# Patient Record
Sex: FEMALE | Race: WHITE | NOT HISPANIC OR LATINO | Employment: UNEMPLOYED | URBAN - NONMETROPOLITAN AREA
[De-identification: names, ages, dates, MRNs, and addresses within clinical notes are randomized per-mention and may not be internally consistent; named-entity substitution may affect disease eponyms.]

---

## 2020-10-15 RX ORDER — MELOXICAM 15 MG/1
15 TABLET ORAL DAILY
COMMUNITY
End: 2021-05-03

## 2020-10-15 RX ORDER — METHOCARBAMOL 500 MG/1
500 TABLET, FILM COATED ORAL 3 TIMES DAILY PRN
COMMUNITY
End: 2021-11-18

## 2020-10-15 RX ORDER — FERROUS SULFATE 325(65) MG
325 TABLET ORAL
COMMUNITY
End: 2021-05-11 | Stop reason: SDUPTHER

## 2020-10-15 RX ORDER — QUETIAPINE FUMARATE 50 MG/1
100 TABLET, FILM COATED ORAL NIGHTLY
COMMUNITY
End: 2020-10-28 | Stop reason: SDUPTHER

## 2020-10-15 RX ORDER — BUMETANIDE 0.5 MG/1
0.5 TABLET ORAL DAILY PRN
COMMUNITY
End: 2021-04-19

## 2020-10-15 RX ORDER — ROSUVASTATIN CALCIUM 10 MG/1
10 TABLET, COATED ORAL EVERY EVENING
COMMUNITY
End: 2021-05-11 | Stop reason: SDUPTHER

## 2020-10-15 RX ORDER — BUSPIRONE HYDROCHLORIDE 15 MG/1
15 TABLET ORAL 3 TIMES DAILY
COMMUNITY
End: 2021-05-11 | Stop reason: SDUPTHER

## 2020-10-15 RX ORDER — MULTIVIT WITH MINERALS/LUTEIN
250 TABLET ORAL
COMMUNITY
End: 2021-05-11 | Stop reason: SDUPTHER

## 2020-10-15 RX ORDER — METOCLOPRAMIDE 5 MG/1
5 TABLET ORAL 3 TIMES DAILY PRN
COMMUNITY
End: 2021-11-18

## 2020-10-15 RX ORDER — GABAPENTIN 400 MG/1
400 CAPSULE ORAL 3 TIMES DAILY
Qty: 90 CAPSULE | Refills: 2 | Status: SHIPPED | OUTPATIENT
Start: 2020-10-15 | End: 2020-12-14 | Stop reason: SDUPTHER

## 2020-10-15 RX ORDER — POTASSIUM CHLORIDE 1.5 G/1.77G
20 POWDER, FOR SOLUTION ORAL DAILY
COMMUNITY
End: 2021-05-11 | Stop reason: SDUPTHER

## 2020-10-15 RX ORDER — LORAZEPAM 0.5 MG/1
0.5 TABLET ORAL 2 TIMES DAILY
Qty: 60 TABLET | Refills: 2 | Status: SHIPPED | OUTPATIENT
Start: 2020-10-15 | End: 2021-01-06

## 2020-10-15 RX ORDER — AMOXICILLIN 250 MG
2 CAPSULE ORAL 2 TIMES DAILY
COMMUNITY
End: 2021-11-18

## 2020-10-15 RX ORDER — PANTOPRAZOLE SODIUM 40 MG/1
40 TABLET, DELAYED RELEASE ORAL EVERY MORNING
COMMUNITY
End: 2021-05-11 | Stop reason: SDUPTHER

## 2020-10-15 RX ORDER — ASPIRIN 81 MG/1
81 TABLET ORAL DAILY
COMMUNITY
End: 2021-05-11 | Stop reason: SDUPTHER

## 2020-10-15 RX ORDER — SODIUM CHLORIDE 1000 MG
1 TABLET, SOLUBLE MISCELLANEOUS 3 TIMES DAILY
COMMUNITY
End: 2021-05-11 | Stop reason: SDUPTHER

## 2020-10-15 RX ORDER — TRAMADOL HYDROCHLORIDE 50 MG/1
50 TABLET ORAL 2 TIMES DAILY
Qty: 60 TABLET | Refills: 2 | Status: SHIPPED | OUTPATIENT
Start: 2020-10-15 | End: 2020-10-28 | Stop reason: SDUPTHER

## 2020-10-15 RX ORDER — TRAMADOL HYDROCHLORIDE 50 MG/1
50 TABLET ORAL 2 TIMES DAILY
COMMUNITY
End: 2020-10-15 | Stop reason: SDUPTHER

## 2020-10-15 RX ORDER — OLMESARTAN MEDOXOMIL 40 MG/1
40 TABLET ORAL NIGHTLY
COMMUNITY
End: 2021-05-03 | Stop reason: SDUPTHER

## 2020-10-15 RX ORDER — MAGNESIUM OXIDE 400 MG/1
400 TABLET ORAL
COMMUNITY
End: 2021-05-11 | Stop reason: SDUPTHER

## 2020-10-15 RX ORDER — ONDANSETRON HYDROCHLORIDE 8 MG/1
8 TABLET, FILM COATED ORAL EVERY 8 HOURS PRN
COMMUNITY
End: 2021-05-11 | Stop reason: SDUPTHER

## 2020-10-15 RX ORDER — THIAMINE HCL 100 MG
TABLET ORAL DAILY
COMMUNITY
End: 2021-05-11 | Stop reason: SDUPTHER

## 2020-10-15 RX ORDER — GABAPENTIN 400 MG/1
400 CAPSULE ORAL 3 TIMES DAILY
COMMUNITY
End: 2020-10-15 | Stop reason: SDUPTHER

## 2020-10-15 RX ORDER — LORAZEPAM 0.5 MG/1
0.5 TABLET ORAL 2 TIMES DAILY
COMMUNITY
End: 2020-10-15 | Stop reason: SDUPTHER

## 2020-10-28 ENCOUNTER — TELEMEDICINE (OUTPATIENT)
Dept: FAMILY MEDICINE CLINIC | Facility: CLINIC | Age: 70
End: 2020-10-28

## 2020-10-28 DIAGNOSIS — M25.552 LEFT HIP PAIN: Primary | ICD-10-CM

## 2020-10-28 DIAGNOSIS — F41.9 ANXIETY: ICD-10-CM

## 2020-10-28 PROCEDURE — 99203 OFFICE O/P NEW LOW 30 MIN: CPT | Performed by: FAMILY MEDICINE

## 2020-10-28 RX ORDER — QUETIAPINE FUMARATE 50 MG/1
TABLET, FILM COATED ORAL
Qty: 90 TABLET | Refills: 5 | Status: SHIPPED | OUTPATIENT
Start: 2020-10-28 | End: 2020-11-25 | Stop reason: SDUPTHER

## 2020-10-28 RX ORDER — TRAMADOL HYDROCHLORIDE 50 MG/1
50 TABLET ORAL 4 TIMES DAILY
Qty: 120 TABLET | Refills: 2 | Status: SHIPPED | OUTPATIENT
Start: 2020-10-28 | End: 2020-12-04 | Stop reason: SDUPTHER

## 2020-10-28 NOTE — PROGRESS NOTES
Subjective   Yung Osei is a 69 y.o. female.   Seen today by video visit due to the Covid-19 quarantine.   Patient new resident at [facility].  She was sent there from the hospital.  She is having a lot of pain in her left hip  Which keeps her up at night, affects her mobility.  She says this is new since she has been at the facility which has been about a month.  She complains of some anxiety.  She is on lorazepam twice a day.  She is asking to go up on this.  She is already on BuSpar 15 mg 3 times a day.  We discussed the lorazepam and I feel like with her going up on the dose of the pain medicine we need to keep the lorazepam where it is or even try to taper down.  She is understanding and is certainly willing to try something else.  She wonders if she might to take the Seroquel in the afternoons as opposed to just at bedtime as it does seem to help with her anxiety.  She does not want anything for depression.    History of Present Illness    The following portions of the patient's history were reviewed and updated as appropriate: allergies, current medications, past family history, past medical history, past social history, past surgical history and problem list.    Review of Systems   Constitutional: Negative.    HENT: Negative.    Respiratory: Negative.  Negative for shortness of breath.    Cardiovascular: Negative.  Negative for chest pain.   Gastrointestinal: Negative.    Musculoskeletal: Positive for arthralgias and gait problem. Negative for myalgias.   Skin: Negative.    Allergic/Immunologic: Negative for immunocompromised state.   Neurological: Negative for dizziness, tremors, seizures, syncope, weakness and numbness.   Hematological: Negative.    Psychiatric/Behavioral: Negative for agitation, confusion, dysphoric mood and sleep disturbance. The patient is not nervous/anxious.    All other systems reviewed and are negative.      Objective   Physical Exam  Constitutional:       General: She is not in  acute distress.     Appearance: She is well-developed.   HENT:      Head: Normocephalic and atraumatic.   Eyes:      General:         Right eye: No discharge.         Left eye: No discharge.      Pupils: Pupils are equal, round, and reactive to light.   Pulmonary:      Effort: Pulmonary effort is normal.   Neurological:      Mental Status: She is alert and oriented to person, place, and time.   Psychiatric:         Behavior: Behavior normal.         Thought Content: Thought content normal.         Judgment: Judgment normal.         Assessment/Plan   Diagnoses and all orders for this visit:    1. Left hip pain (Primary)  -     traMADol (ULTRAM) 50 MG tablet; Take 1 tablet by mouth 4 (Four) Times a Day.  Dispense: 120 tablet; Refill: 2    2. Anxiety  -     QUEtiapine (SEROquel) 50 MG tablet; 1 in p.m. and 2 at hs.  Dispense: 90 tablet; Refill: 5    The patient has read and signed the [facility] Controlled Substance Contract.  I will continue to see patient for regular follow up appointments. Patient is well controlled on the medication.  JEANE has been reviewed by me and is updated every 3 months. The patient is aware of the potential for addiction and dependence.   We will increase tramadol up to 4 times a day for the hip pain issues.    We will continue with Seroquel in the evenings at 100 mg and will add an afternoon dose to help with anxiety.  She feels this would be a good time to have it.  She may continue with lorazepam only twice daily for anxiety continue buspirone.  She does not wish to have anything else added for depression     There is no past medical history available in epic for review.  We will try to get records and follow-up accordingly.        This document has been electronically signed by Senait Hussein MD on October 28, 2020 12:37 CDT

## 2020-11-25 DIAGNOSIS — F41.9 ANXIETY: ICD-10-CM

## 2020-11-25 RX ORDER — QUETIAPINE FUMARATE 50 MG/1
TABLET, FILM COATED ORAL
Qty: 90 TABLET | Refills: 5 | Status: SHIPPED | OUTPATIENT
Start: 2020-11-25 | End: 2021-05-03 | Stop reason: SDUPTHER

## 2020-12-04 DIAGNOSIS — M25.552 LEFT HIP PAIN: ICD-10-CM

## 2020-12-04 RX ORDER — TRAMADOL HYDROCHLORIDE 50 MG/1
50 TABLET ORAL 4 TIMES DAILY
Qty: 120 TABLET | Refills: 2 | Status: SHIPPED | OUTPATIENT
Start: 2020-12-04 | End: 2021-02-10 | Stop reason: SDUPTHER

## 2020-12-14 RX ORDER — GABAPENTIN 400 MG/1
400 CAPSULE ORAL 3 TIMES DAILY
Qty: 90 CAPSULE | Refills: 2 | Status: SHIPPED | OUTPATIENT
Start: 2020-12-14 | End: 2021-03-09

## 2021-01-06 DIAGNOSIS — F41.9 ANXIETY: Primary | ICD-10-CM

## 2021-01-06 RX ORDER — LORAZEPAM 0.5 MG/1
TABLET ORAL
Qty: 60 TABLET | Refills: 0 | Status: SHIPPED | OUTPATIENT
Start: 2021-01-06 | End: 2021-01-08 | Stop reason: SDUPTHER

## 2021-01-08 DIAGNOSIS — F41.9 ANXIETY: ICD-10-CM

## 2021-01-08 RX ORDER — LORAZEPAM 0.5 MG/1
0.5 TABLET ORAL 2 TIMES DAILY
Qty: 60 TABLET | Refills: 2 | Status: SHIPPED | OUTPATIENT
Start: 2021-01-08 | End: 2021-03-09 | Stop reason: SDUPTHER

## 2021-01-12 ENCOUNTER — TELEMEDICINE (OUTPATIENT)
Dept: FAMILY MEDICINE CLINIC | Facility: CLINIC | Age: 71
End: 2021-01-12

## 2021-01-12 DIAGNOSIS — L23.9 ALLERGIC DERMATITIS: Primary | ICD-10-CM

## 2021-01-12 DIAGNOSIS — F41.9 ANXIETY: ICD-10-CM

## 2021-01-12 PROCEDURE — 99213 OFFICE O/P EST LOW 20 MIN: CPT | Performed by: FAMILY MEDICINE

## 2021-01-12 RX ORDER — TRIAMCINOLONE ACETONIDE 5 MG/G
CREAM TOPICAL 3 TIMES DAILY
Qty: 100 G | Refills: 5 | Status: SHIPPED | OUTPATIENT
Start: 2021-01-12 | End: 2021-04-19

## 2021-01-12 RX ORDER — ACETAMINOPHEN 500 MG
500 TABLET ORAL EVERY 6 HOURS PRN
COMMUNITY
End: 2021-11-18

## 2021-01-12 RX ORDER — DIPHENHYDRAMINE HCL 25 MG
25 TABLET ORAL EVERY 6 HOURS PRN
Qty: 30 TABLET | Refills: 5 | Status: SHIPPED | OUTPATIENT
Start: 2021-01-12 | End: 2021-05-11 | Stop reason: SDUPTHER

## 2021-01-12 NOTE — PROGRESS NOTES
Arleen Osei is a 70 y.o. female.   Seen today by video visit due to the Covid-19 quarantine.   Patient is a resident at [facility].   She has a rash on her chest.  She says that she switch to Dial soap which she does not usually use and feels like this is when it started.    Also is requesting an increase in her Seroquel but staff tells me that she sleeps nearly all day anyway.  History of Present Illness    The following portions of the patient's history were reviewed and updated as appropriate: allergies, current medications, past family history, past medical history, past social history, past surgical history and problem list.    Review of Systems   Constitutional: Negative.    HENT: Negative.    Respiratory: Negative.  Negative for shortness of breath.    Cardiovascular: Negative.  Negative for chest pain.   Gastrointestinal: Negative.    Musculoskeletal: Positive for arthralgias and gait problem. Negative for myalgias.   Skin: Negative.    Allergic/Immunologic: Negative for immunocompromised state.   Neurological: Negative for dizziness, tremors, seizures, syncope, weakness and numbness.   Hematological: Negative.    Psychiatric/Behavioral: Negative for agitation, confusion, dysphoric mood and sleep disturbance. The patient is not nervous/anxious.    All other systems reviewed and are negative.      Objective    Physical Exam  Constitutional:       General: She is not in acute distress.     Appearance: She is well-developed.   HENT:      Head: Normocephalic and atraumatic.   Eyes:      General:         Right eye: No discharge.         Left eye: No discharge.      Pupils: Pupils are equal, round, and reactive to light.   Pulmonary:      Effort: Pulmonary effort is normal.   Skin:     Findings: Rash present.      Comments: She has an excoriated rash over the anterior upper chest with some small red scabbed area   Neurological:      Mental Status: She is alert and oriented to person, place, and time.    Psychiatric:         Behavior: Behavior normal.         Thought Content: Thought content normal.         Judgment: Judgment normal.         Assessment/Plan   Diagnoses and all orders for this visit:    1. Allergic dermatitis (Primary)  -     diphenhydrAMINE (Benadryl Allergy) 25 MG tablet; Take 1 tablet by mouth Every 6 (Six) Hours As Needed for Itching.  Dispense: 30 tablet; Refill: 5  -     triamcinolone (KENALOG) 0.5 % cream; Apply  topically to the appropriate area as directed 3 (Three) Times a Day.  Dispense: 100 g; Refill: 5    2. Anxiety    Added Benadryl and Kenalog and the going to switch her to a milder soap and if rash is not improving in a week or 2 let me know.    Advised against the increase in the Seroquel for anxiety as she sleeps all day and would like to see her actually more active.  Staff is going to continue to watch closely        This document has been electronically signed by Senait Hussein MD on January 12, 2021 14:30 CST

## 2021-02-10 DIAGNOSIS — M25.552 LEFT HIP PAIN: ICD-10-CM

## 2021-02-10 RX ORDER — TRAMADOL HYDROCHLORIDE 50 MG/1
50 TABLET ORAL 4 TIMES DAILY
Qty: 120 TABLET | Refills: 2 | Status: SHIPPED | OUTPATIENT
Start: 2021-02-10 | End: 2021-04-07 | Stop reason: SDUPTHER

## 2021-03-09 ENCOUNTER — TELEMEDICINE (OUTPATIENT)
Dept: FAMILY MEDICINE CLINIC | Facility: CLINIC | Age: 71
End: 2021-03-09

## 2021-03-09 DIAGNOSIS — Z12.31 ENCOUNTER FOR SCREENING MAMMOGRAM FOR BREAST CANCER: ICD-10-CM

## 2021-03-09 DIAGNOSIS — I10 ESSENTIAL HYPERTENSION: ICD-10-CM

## 2021-03-09 DIAGNOSIS — E11.42 DIABETIC PERIPHERAL NEUROPATHY (HCC): ICD-10-CM

## 2021-03-09 DIAGNOSIS — G47.00 INSOMNIA, UNSPECIFIED TYPE: Primary | ICD-10-CM

## 2021-03-09 DIAGNOSIS — F41.9 ANXIETY: ICD-10-CM

## 2021-03-09 PROCEDURE — 99214 OFFICE O/P EST MOD 30 MIN: CPT | Performed by: FAMILY MEDICINE

## 2021-03-09 RX ORDER — LORAZEPAM 0.5 MG/1
0.5 TABLET ORAL 2 TIMES DAILY
Qty: 90 TABLET | Refills: 2 | Status: SHIPPED | OUTPATIENT
Start: 2021-03-09 | End: 2021-03-09 | Stop reason: SDUPTHER

## 2021-03-09 RX ORDER — AMLODIPINE BESYLATE 5 MG/1
5 TABLET ORAL DAILY
Qty: 30 TABLET | Refills: 5 | Status: SHIPPED | OUTPATIENT
Start: 2021-03-09 | End: 2021-04-19

## 2021-03-09 RX ORDER — GABAPENTIN 800 MG/1
800 TABLET ORAL 3 TIMES DAILY
Qty: 90 TABLET | Refills: 2 | Status: SHIPPED | OUTPATIENT
Start: 2021-03-09 | End: 2021-04-05 | Stop reason: SDUPTHER

## 2021-03-09 RX ORDER — LORAZEPAM 0.5 MG/1
0.5 TABLET ORAL 3 TIMES DAILY PRN
Qty: 90 TABLET | Refills: 2 | Status: SHIPPED | OUTPATIENT
Start: 2021-03-09 | End: 2021-04-05 | Stop reason: SDUPTHER

## 2021-03-09 RX ORDER — LOSARTAN POTASSIUM 100 MG/1
100 TABLET ORAL DAILY
Qty: 30 TABLET | Refills: 5 | Status: SHIPPED | OUTPATIENT
Start: 2021-03-09 | End: 2021-03-09

## 2021-03-09 NOTE — PROGRESS NOTES
Subjective   Yung Osei is a 70 y.o. female.   Seen today by video visit due to the Covid-19 quarantine.   Patient is a resident at [facility].   Her blood pressure has been getting a bit high recently.  Is staying in the 160/110 range quite often.  She is on olmesartan 40 mg.  She notes that she has been a bit more nervous.  She resides at the nursing facility and says that sometimes patients yell out and this really upsets her.  She wonders about taking the lorazepam up to 3 times a day when needed.    She still not sleeping at night.  She takes Seroquel 1 in the daytime and 2 at night but this does not seem to help.  We gave Belsomra but I believe there were some insurance coverage issues and she could not get it.    Also she is having increasing pain at night that bothers her nerve legs back and joints.  She does have diabetic neuropathy and is on gabapentin 400 mg.    History of Present Illness    The following portions of the patient's history were reviewed and updated as appropriate: allergies, current medications, past family history, past medical history, past social history, past surgical history and problem list.    Review of Systems   Constitutional: Negative.    HENT: Negative.    Respiratory: Negative.  Negative for shortness of breath.    Cardiovascular: Negative.  Negative for chest pain.   Gastrointestinal: Negative.    Musculoskeletal: Positive for arthralgias and gait problem. Negative for myalgias.   Skin: Negative.    Allergic/Immunologic: Negative for immunocompromised state.   Neurological: Negative for dizziness, tremors, seizures, syncope, weakness and numbness.   Hematological: Negative.    Psychiatric/Behavioral: Negative for agitation, confusion, dysphoric mood and sleep disturbance. The patient is not nervous/anxious.    All other systems reviewed and are negative.    Objective    Physical Exam  Constitutional:       General: She is not in acute distress.     Appearance: She is  well-developed.   HENT:      Head: Normocephalic and atraumatic.   Eyes:      General:         Right eye: No discharge.         Left eye: No discharge.      Pupils: Pupils are equal, round, and reactive to light.   Skin:     Comments:     Neurological:      Mental Status: She is alert and oriented to person, place, and time.   Psychiatric:         Behavior: Behavior normal.         Thought Content: Thought content normal.         Judgment: Judgment normal.         Assessment/Plan   Diagnoses and all orders for this visit:    1. Insomnia, unspecified type (Primary)    2. Anxiety  -     Discontinue: LORazepam (ATIVAN) 0.5 MG tablet; Take 1 tablet by mouth 2 (Two) Times a Day.  Dispense: 90 tablet; Refill: 2  -     LORazepam (ATIVAN) 0.5 MG tablet; Take 1 tablet by mouth 3 (Three) Times a Day As Needed for Anxiety.  Dispense: 90 tablet; Refill: 2    3. Diabetic peripheral neuropathy (CMS/HCC)  -     gabapentin (Neurontin) 800 MG tablet; Take 1 tablet by mouth 3 (Three) Times a Day.  Dispense: 90 tablet; Refill: 2    4. Essential hypertension  -     Discontinue: losartan (COZAAR) 100 MG tablet; Take 1 tablet by mouth Daily.  Dispense: 30 tablet; Refill: 5  -     amLODIPine (NORVASC) 5 MG tablet; Take 1 tablet by mouth Daily.  Dispense: 30 tablet; Refill: 5    We can increase the gabapentin to 800 mg and hope this will help her sleep better at night by reducing her pain and for now we will not change the Seroquel or  add more sleeping medicine    We will increase the lorazepam to up to 3 times a day as needed for anxiety and follow-up with me in a month if not improving.  We will also continue buspirone.    We will add amlodipine for hypertension, have him continue to monitor regularly and contact me if not controlled at goal of 130/80 over the next few days        This document has been electronically signed by Senait Hussein MD on March 9, 2021 11:58 CST

## 2021-04-05 DIAGNOSIS — E11.42 DIABETIC PERIPHERAL NEUROPATHY (HCC): ICD-10-CM

## 2021-04-05 DIAGNOSIS — F41.9 ANXIETY: ICD-10-CM

## 2021-04-05 RX ORDER — GABAPENTIN 800 MG/1
800 TABLET ORAL 3 TIMES DAILY
Qty: 90 TABLET | Refills: 2 | Status: SHIPPED | OUTPATIENT
Start: 2021-04-05 | End: 2021-05-04 | Stop reason: SDUPTHER

## 2021-04-05 RX ORDER — LORAZEPAM 0.5 MG/1
0.5 TABLET ORAL 3 TIMES DAILY PRN
Qty: 90 TABLET | Refills: 2 | Status: SHIPPED | OUTPATIENT
Start: 2021-04-05 | End: 2021-05-04 | Stop reason: SDUPTHER

## 2021-04-07 DIAGNOSIS — M25.552 LEFT HIP PAIN: ICD-10-CM

## 2021-04-07 RX ORDER — TRAMADOL HYDROCHLORIDE 50 MG/1
50 TABLET ORAL 4 TIMES DAILY
Qty: 120 TABLET | Refills: 2 | Status: SHIPPED | OUTPATIENT
Start: 2021-04-07 | End: 2021-04-19

## 2021-04-19 ENCOUNTER — OFFICE VISIT (OUTPATIENT)
Dept: FAMILY MEDICINE CLINIC | Facility: CLINIC | Age: 71
End: 2021-04-19

## 2021-04-19 VITALS — WEIGHT: 182 LBS | HEIGHT: 62 IN | BODY MASS INDEX: 33.49 KG/M2

## 2021-04-19 DIAGNOSIS — I87.2 VENOUS STASIS DERMATITIS OF BOTH LOWER EXTREMITIES: ICD-10-CM

## 2021-04-19 DIAGNOSIS — I10 ESSENTIAL HYPERTENSION: ICD-10-CM

## 2021-04-19 DIAGNOSIS — M25.552 LEFT HIP PAIN: Primary | ICD-10-CM

## 2021-04-19 PROBLEM — F31.9 BIPOLAR 1 DISORDER (HCC): Status: ACTIVE | Noted: 2020-05-18

## 2021-04-19 PROBLEM — I50.22 CHRONIC SYSTOLIC HEART FAILURE (HCC): Status: ACTIVE | Noted: 2020-05-18

## 2021-04-19 PROBLEM — K21.9 GASTROESOPHAGEAL REFLUX DISEASE: Status: ACTIVE | Noted: 2020-05-18

## 2021-04-19 PROBLEM — I63.9 CVA (CEREBRAL VASCULAR ACCIDENT) (HCC): Status: ACTIVE | Noted: 2020-08-05

## 2021-04-19 PROBLEM — F41.1 GENERALIZED ANXIETY DISORDER: Status: ACTIVE | Noted: 2020-05-18

## 2021-04-19 PROBLEM — G47.00 INSOMNIA: Status: ACTIVE | Noted: 2020-05-18

## 2021-04-19 PROBLEM — F03.90 DEMENTIA: Status: ACTIVE | Noted: 2020-08-04

## 2021-04-19 PROBLEM — M54.41 CHRONIC MIDLINE LOW BACK PAIN WITH BILATERAL SCIATICA: Status: ACTIVE | Noted: 2020-05-18

## 2021-04-19 PROBLEM — G89.29 CHRONIC MIDLINE LOW BACK PAIN WITH BILATERAL SCIATICA: Status: ACTIVE | Noted: 2020-05-18

## 2021-04-19 PROBLEM — J43.9 PULMONARY EMPHYSEMA (HCC): Status: ACTIVE | Noted: 2020-05-18

## 2021-04-19 PROBLEM — M54.42 CHRONIC MIDLINE LOW BACK PAIN WITH BILATERAL SCIATICA: Status: ACTIVE | Noted: 2020-05-18

## 2021-04-19 PROBLEM — F13.20 BENZODIAZEPINE DEPENDENCE (HCC): Status: ACTIVE | Noted: 2020-06-09

## 2021-04-19 PROCEDURE — 99214 OFFICE O/P EST MOD 30 MIN: CPT | Performed by: FAMILY MEDICINE

## 2021-04-19 RX ORDER — LORATADINE 10 MG/1
10 CAPSULE, LIQUID FILLED ORAL DAILY
COMMUNITY
End: 2021-05-11 | Stop reason: SDUPTHER

## 2021-04-19 RX ORDER — TRAMADOL HYDROCHLORIDE 50 MG/1
TABLET ORAL
Qty: 180 TABLET | Refills: 2 | Status: SHIPPED | OUTPATIENT
Start: 2021-04-19 | End: 2021-05-03 | Stop reason: SDUPTHER

## 2021-04-19 RX ORDER — FUROSEMIDE 20 MG/1
TABLET ORAL
COMMUNITY
Start: 2021-04-03 | End: 2021-04-19

## 2021-04-19 RX ORDER — CEPHALEXIN 500 MG/1
500 CAPSULE ORAL 2 TIMES DAILY
Qty: 20 CAPSULE | Refills: 0 | Status: SHIPPED | OUTPATIENT
Start: 2021-04-19 | End: 2021-05-03

## 2021-04-19 RX ORDER — FUROSEMIDE 40 MG/1
40 TABLET ORAL DAILY
Qty: 30 TABLET | Refills: 5 | Status: SHIPPED | OUTPATIENT
Start: 2021-04-19 | End: 2021-05-11 | Stop reason: SDUPTHER

## 2021-04-19 RX ORDER — LOSARTAN POTASSIUM 100 MG/1
100 TABLET ORAL DAILY
Qty: 90 TABLET | Refills: 3 | Status: SHIPPED | OUTPATIENT
Start: 2021-04-19 | End: 2021-05-03

## 2021-04-19 NOTE — PROGRESS NOTES
Arleen Osei is a 70 y.o. female.   Who resides at [facility] who is here today for follow-up after an ER visit for swelling of both legs.  She also had some cellulitis.  She was given a low-dose of Lasix for 15 days.  She is already on a very low dose of Bumex.  She was also given some Keflex for cellulitis.  Symptoms have not improved.  Patient states that she is fairly active and does try to get up and walk around and then when she does that she always tries to sit with her feet elevated.  She does not have any compression stockings though.    Also having a lot of pain in the left hip and wonders if she might be able to get an increase in her pain medicine.    History of Present Illness    The following portions of the patient's history were reviewed and updated as appropriate: allergies, current medications, past family history, past medical history, past social history, past surgical history and problem list.    Review of Systems   Constitutional: Negative.    HENT: Negative.    Respiratory: Negative.  Negative for shortness of breath.    Cardiovascular: Positive for leg swelling. Negative for chest pain.   Gastrointestinal: Negative.    Musculoskeletal: Positive for arthralgias, back pain and gait problem. Negative for myalgias.   Skin: Negative.    Allergic/Immunologic: Negative for immunocompromised state.   Neurological: Negative for dizziness, tremors, seizures, syncope, weakness and numbness.   Hematological: Negative.    Psychiatric/Behavioral: Negative for agitation, confusion, dysphoric mood and sleep disturbance. The patient is not nervous/anxious.    All other systems reviewed and are negative.      Objective   Physical Exam  Vitals and nursing note reviewed.   Constitutional:       General: She is not in acute distress.     Appearance: She is not diaphoretic.   HENT:      Head: Normocephalic and atraumatic.      Nose: Nose normal.   Eyes:      General: No scleral icterus.         Right eye: No discharge.         Left eye: No discharge.      Pupils: Pupils are equal, round, and reactive to light.   Neck:      Thyroid: No thyromegaly.      Trachea: No tracheal deviation.   Cardiovascular:      Rate and Rhythm: Normal rate and regular rhythm.      Heart sounds: No murmur heard.   No friction rub.   Abdominal:      General: Bowel sounds are normal. There is no distension.      Palpations: Abdomen is soft. There is no mass.      Tenderness: There is no abdominal tenderness. There is no guarding or rebound.   Musculoskeletal:         General: Swelling present. No tenderness or deformity.      Cervical back: Normal range of motion and neck supple.      Comments: 2-3+ edema on both lower extremities with redness of the skin.   Skin:     General: Skin is warm and dry.   Neurological:      Mental Status: She is alert and oriented to person, place, and time.      Cranial Nerves: No cranial nerve deficit.   Psychiatric:         Behavior: Behavior normal.         Assessment/Plan   Diagnoses and all orders for this visit:    1. Left hip pain (Primary)  -     traMADol (ULTRAM) 50 MG tablet; 2 tablets by mouth TID  Dispense: 180 tablet; Refill: 2    2. Venous stasis dermatitis of both lower extremities  -     cephalexin (KEFLEX) 500 MG capsule; Take 1 capsule by mouth 2 (Two) Times a Day.  Dispense: 20 capsule; Refill: 0  -     furosemide (LASIX) 40 MG tablet; Take 1 tablet by mouth Daily.  Dispense: 30 tablet; Refill: 5    3. Essential hypertension  -     losartan (COZAAR) 100 MG tablet; Take 1 tablet by mouth Daily for 180 days.  Dispense: 90 tablet; Refill: 3    Will switch from amlodipine to losartan as her blood pressures are well controlled in the amlodipine could possibly be contributing to the swelling.  Recommend patient sit with her feet up at all times when she is not ambulating and wear compression stockings.  Will increase Lasix to 40 mg daily, continue potassium supplements, and discontinue  bumetanide.  Will check labs including BMP and BNP now and in 1 month.    Will give Keflex for cellulitis    Increase tramadol 200 mg 3 times a day for pain issues          This document has been electronically signed by Senait Hussein MD on April 19, 2021 16:22 CDT

## 2021-05-03 ENCOUNTER — LAB (OUTPATIENT)
Dept: LAB | Facility: OTHER | Age: 71
End: 2021-05-03

## 2021-05-03 ENCOUNTER — OFFICE VISIT (OUTPATIENT)
Dept: FAMILY MEDICINE CLINIC | Facility: CLINIC | Age: 71
End: 2021-05-03

## 2021-05-03 VITALS — WEIGHT: 182 LBS | HEIGHT: 62 IN | BODY MASS INDEX: 33.49 KG/M2

## 2021-05-03 DIAGNOSIS — I10 ESSENTIAL HYPERTENSION: ICD-10-CM

## 2021-05-03 DIAGNOSIS — F41.9 ANXIETY: ICD-10-CM

## 2021-05-03 DIAGNOSIS — M25.552 LEFT HIP PAIN: ICD-10-CM

## 2021-05-03 DIAGNOSIS — I50.9 CONGESTIVE HEART FAILURE, UNSPECIFIED HF CHRONICITY, UNSPECIFIED HEART FAILURE TYPE (HCC): Primary | ICD-10-CM

## 2021-05-03 DIAGNOSIS — I87.2 VENOUS STASIS DERMATITIS OF BOTH LOWER EXTREMITIES: ICD-10-CM

## 2021-05-03 LAB
ALBUMIN SERPL-MCNC: 4 G/DL (ref 3.5–5)
ALBUMIN/GLOB SERPL: 1.4 G/DL (ref 1.1–1.8)
ALP SERPL-CCNC: 100 U/L (ref 38–126)
ALT SERPL W P-5'-P-CCNC: 14 U/L
ANION GAP SERPL CALCULATED.3IONS-SCNC: 9 MMOL/L (ref 5–15)
AST SERPL-CCNC: 26 U/L (ref 14–36)
BILIRUB SERPL-MCNC: 0.3 MG/DL (ref 0.2–1.3)
BNP SERPL-MCNC: 26.1 PG/ML (ref 0–100)
BUN SERPL-MCNC: 13 MG/DL (ref 7–23)
BUN/CREAT SERPL: 13.4 (ref 7–25)
CALCIUM SPEC-SCNC: 9 MG/DL (ref 8.4–10.2)
CHLORIDE SERPL-SCNC: 98 MMOL/L (ref 101–112)
CO2 SERPL-SCNC: 30 MMOL/L (ref 22–30)
CREAT SERPL-MCNC: 0.97 MG/DL (ref 0.52–1.04)
GFR SERPL CREATININE-BSD FRML MDRD: 57 ML/MIN/1.73 (ref 39–90)
GLOBULIN UR ELPH-MCNC: 2.8 GM/DL (ref 2.3–3.5)
GLUCOSE SERPL-MCNC: 105 MG/DL (ref 70–99)
POTASSIUM SERPL-SCNC: 4 MMOL/L (ref 3.4–5)
PROT SERPL-MCNC: 6.8 G/DL (ref 6.3–8.6)
SODIUM SERPL-SCNC: 137 MMOL/L (ref 137–145)

## 2021-05-03 PROCEDURE — 99214 OFFICE O/P EST MOD 30 MIN: CPT | Performed by: FAMILY MEDICINE

## 2021-05-03 PROCEDURE — 80053 COMPREHEN METABOLIC PANEL: CPT | Performed by: FAMILY MEDICINE

## 2021-05-03 PROCEDURE — 36415 COLL VENOUS BLD VENIPUNCTURE: CPT | Performed by: FAMILY MEDICINE

## 2021-05-03 PROCEDURE — 83880 ASSAY OF NATRIURETIC PEPTIDE: CPT | Performed by: FAMILY MEDICINE

## 2021-05-03 RX ORDER — OLMESARTAN MEDOXOMIL 40 MG/1
40 TABLET ORAL DAILY
Start: 2021-05-03 | End: 2021-05-11 | Stop reason: SDUPTHER

## 2021-05-03 RX ORDER — TRAMADOL HYDROCHLORIDE 50 MG/1
TABLET ORAL
Qty: 180 TABLET | Refills: 2 | Status: SHIPPED | OUTPATIENT
Start: 2021-05-03 | End: 2021-05-04 | Stop reason: SDUPTHER

## 2021-05-03 RX ORDER — BUMETANIDE 1 MG/1
TABLET ORAL
Qty: 180 TABLET | Refills: 3 | Status: SHIPPED | OUTPATIENT
Start: 2021-05-03 | End: 2021-05-11 | Stop reason: SDUPTHER

## 2021-05-03 RX ORDER — AMLODIPINE BESYLATE 5 MG/1
TABLET ORAL
Qty: 180 TABLET | Refills: 3 | Status: SHIPPED | OUTPATIENT
Start: 2021-05-03 | End: 2021-05-11 | Stop reason: SDUPTHER

## 2021-05-03 RX ORDER — MELOXICAM 15 MG/1
TABLET ORAL
Qty: 90 TABLET | Refills: 3 | Status: SHIPPED | OUTPATIENT
Start: 2021-05-03 | End: 2021-05-11 | Stop reason: SDUPTHER

## 2021-05-03 RX ORDER — ALBUTEROL SULFATE 90 UG/1
AEROSOL, METERED RESPIRATORY (INHALATION)
Qty: 18 G | Refills: 5 | Status: SHIPPED | OUTPATIENT
Start: 2021-05-03 | End: 2021-05-11 | Stop reason: SDUPTHER

## 2021-05-03 RX ORDER — QUETIAPINE FUMARATE 50 MG/1
TABLET, FILM COATED ORAL
Qty: 120 TABLET | Refills: 5 | Status: SHIPPED | OUTPATIENT
Start: 2021-05-03 | End: 2021-05-11 | Stop reason: SDUPTHER

## 2021-05-03 RX ORDER — METOPROLOL TARTRATE 75 MG/1
TABLET, FILM COATED ORAL
Qty: 180 TABLET | Refills: 3 | Status: SHIPPED | OUTPATIENT
Start: 2021-05-03 | End: 2021-05-11 | Stop reason: SDUPTHER

## 2021-05-03 NOTE — PROGRESS NOTES
Subjective   Yung Osei is a 70 y.o. female.   Who resides at [facility] but tells me she is going home in the next day or 2 to live with her son in [place].  She went to see about changing her pain medicine and would like for me to send in enough medicine to last until she can get back to her regular doctor in [place].    She still has a lot of swelling of the lower extremities.  The Keflex helped for a certain amount of time with the cellulitis but it seems back    Blood pressure has been a bit high at times but I looked at her list and evidently she was still getting losartan and on losartan.    At her last labs last month she did have a mildly elevated BNP.  Was not told before as far she knows that she has congestive heart failure.    She would like to see about adding an afternoon dose of Seroquel for anxiety.  She does feel it helps.  History of Present Illness    The following portions of the patient's history were reviewed and updated as appropriate: allergies, current medications, past family history, past medical history, past social history, past surgical history and problem list.    Review of Systems   Constitutional: Negative.    HENT: Negative.    Respiratory: Negative.  Negative for shortness of breath.    Cardiovascular: Positive for leg swelling. Negative for chest pain.   Gastrointestinal: Negative.    Musculoskeletal: Positive for arthralgias, back pain and gait problem. Negative for myalgias.   Skin: Negative.    Allergic/Immunologic: Negative for immunocompromised state.   Neurological: Negative for dizziness, tremors, seizures, syncope, weakness and numbness.   Hematological: Negative.    Psychiatric/Behavioral: Negative for agitation, confusion, dysphoric mood and sleep disturbance. The patient is not nervous/anxious.    All other systems reviewed and are negative.      Objective    Body mass index is 33.29 kg/m².  Vitals:    05/03/21 0823   Weight: 82.6 kg (182 lb)   Height:  "157.5 cm (62\")       Physical Exam  Vitals and nursing note reviewed.   Constitutional:       General: She is not in acute distress.     Appearance: She is not diaphoretic.   HENT:      Head: Normocephalic and atraumatic.      Nose: Nose normal.   Eyes:      General: No scleral icterus.        Right eye: No discharge.         Left eye: No discharge.      Pupils: Pupils are equal, round, and reactive to light.   Neck:      Thyroid: No thyromegaly.      Trachea: No tracheal deviation.   Cardiovascular:      Rate and Rhythm: Normal rate and regular rhythm.      Heart sounds: No murmur heard.   No friction rub.   Abdominal:      General: Bowel sounds are normal. There is no distension.      Palpations: Abdomen is soft. There is no mass.      Tenderness: There is no abdominal tenderness. There is no guarding or rebound.   Musculoskeletal:         General: Swelling present. No tenderness or deformity.      Cervical back: Normal range of motion and neck supple.      Comments: 2-3+ edema on both lower extremities with redness of the skin.   Skin:     General: Skin is warm and dry.   Neurological:      Mental Status: She is alert and oriented to person, place, and time.      Cranial Nerves: No cranial nerve deficit.   Psychiatric:         Behavior: Behavior normal.       Specimen Information: Blood        Component   Ref Range & Units 09:09   BNP   0.0 - 100.0 pg/mL 26.1    Resulting Agency [facility]         Specimen Collected: 05/03/21 09:09   Last Resulted: 05/03/21 09:39           Contains abnormal data Comprehensive Metabolic Panel  Order: [id number]  Status:  Final result   Visible to patient:  No (not released)   Next appt:  05/05/2021 at 02:00 PM in Radiology ([facility])   Dx:  Congestive heart failure, unspecified...  Specimen Information: Blood        Component   Ref Range & Units 09:09   Glucose   70 - 99 mg/dL 105High     BUN   7 - 23 mg/dL 13    Creatinine   0.52 - 1.04 mg/dL 0.97    Sodium   137 - 145 mmol/L " 137    Potassium   3.4 - 5.0 mmol/L 4.0    Chloride   101 - 112 mmol/L 98Low     CO2   22.0 - 30.0 mmol/L 30.0    Calcium   8.4 - 10.2 mg/dL 9.0    Total Protein   6.3 - 8.6 g/dL 6.8    Albumin   3.50 - 5.00 g/dL 4.00    ALT (SGPT)   <=35 U/L 14    AST (SGOT)   14 - 36 U/L 26    Alkaline Phosphatase   38 - 126 U/L 100    Total Bilirubin   0.2 - 1.3 mg/dL 0.3    eGFR Non African Amer   39 - 90 mL/min/1.73 57    Globulin   2.3 - 3.5 gm/dL 2.8    A/G Ratio   1.1 - 1.8 g/dL 1.4    BUN/Creatinine Ratio   7.0 - 25.0 13.4    Anion Gap   5.0 - 15.0 mmol/L 9.0    Resulting Agency [facility]      Narrative              Assessment/Plan   Diagnoses and all orders for this visit:    1. Congestive heart failure, unspecified HF chronicity, unspecified heart failure type (CMS/HCC) (Primary)  -     Ambulatory Referral to Cardiology  -     BNP  -     Comprehensive Metabolic Panel    2. Anxiety  -     QUEtiapine (SEROquel) 50 MG tablet; 1 in a.m., 1 at noon, 2 at bedtime  Dispense: 120 tablet; Refill: 5    3. Venous stasis dermatitis of both lower extremities    4. Essential hypertension  -     olmesartan (BENICAR) 40 MG tablet; Take 1 tablet by mouth Daily. With B/P check    Continue current dose of Lasix.  BNP level is back to normal.  Renal functions a potassium are also normal.    Referral is placed for cardiologist in [place] where she will be living    We will correct medication so that she is not taking Benicar and losartan as well    We will increase the Seroquel to add an afternoon dose.          This document has been electronically signed by Senait Hussein MD on May 3, 2021 08:52 CDT

## 2021-05-04 DIAGNOSIS — E11.42 DIABETIC PERIPHERAL NEUROPATHY (HCC): ICD-10-CM

## 2021-05-04 DIAGNOSIS — M25.552 LEFT HIP PAIN: ICD-10-CM

## 2021-05-04 DIAGNOSIS — F41.9 ANXIETY: ICD-10-CM

## 2021-05-04 RX ORDER — GABAPENTIN 800 MG/1
800 TABLET ORAL 3 TIMES DAILY
Qty: 90 TABLET | Refills: 0 | Status: SHIPPED | OUTPATIENT
Start: 2021-05-04 | End: 2021-05-11 | Stop reason: SDUPTHER

## 2021-05-04 RX ORDER — LORAZEPAM 0.5 MG/1
0.5 TABLET ORAL 3 TIMES DAILY PRN
Qty: 90 TABLET | Refills: 0 | Status: SHIPPED | OUTPATIENT
Start: 2021-05-04 | End: 2021-05-11 | Stop reason: SDUPTHER

## 2021-05-04 RX ORDER — TRAMADOL HYDROCHLORIDE 50 MG/1
TABLET ORAL
Qty: 180 TABLET | Refills: 0 | Status: SHIPPED | OUTPATIENT
Start: 2021-05-04 | End: 2021-05-11 | Stop reason: SDUPTHER

## 2021-05-04 NOTE — PROGRESS NOTES
Notify patient test results are ok, no congestive heart failure.  Please send copy to [facility]

## 2021-05-11 DIAGNOSIS — L23.9 ALLERGIC DERMATITIS: ICD-10-CM

## 2021-05-11 DIAGNOSIS — F41.9 ANXIETY: ICD-10-CM

## 2021-05-11 DIAGNOSIS — I10 ESSENTIAL HYPERTENSION: ICD-10-CM

## 2021-05-11 DIAGNOSIS — I87.2 VENOUS STASIS DERMATITIS OF BOTH LOWER EXTREMITIES: ICD-10-CM

## 2021-05-11 DIAGNOSIS — M25.552 LEFT HIP PAIN: ICD-10-CM

## 2021-05-11 DIAGNOSIS — E11.42 DIABETIC PERIPHERAL NEUROPATHY (HCC): ICD-10-CM

## 2021-05-11 RX ORDER — DIPHENHYDRAMINE HCL 25 MG
25 TABLET ORAL EVERY 6 HOURS PRN
Qty: 30 TABLET | Refills: 5 | Status: SHIPPED | OUTPATIENT
Start: 2021-05-11 | End: 2022-04-18

## 2021-05-11 RX ORDER — MULTIVIT WITH MINERALS/LUTEIN
250 TABLET ORAL
Qty: 270 TABLET | Refills: 3 | Status: SHIPPED | OUTPATIENT
Start: 2021-05-11 | End: 2021-11-18

## 2021-05-11 RX ORDER — MELOXICAM 15 MG/1
15 TABLET ORAL DAILY
Qty: 90 TABLET | Refills: 3 | Status: SHIPPED | OUTPATIENT
Start: 2021-05-11 | End: 2021-11-18

## 2021-05-11 RX ORDER — ROSUVASTATIN CALCIUM 10 MG/1
10 TABLET, COATED ORAL EVERY EVENING
Qty: 90 TABLET | Refills: 3 | Status: SHIPPED | OUTPATIENT
Start: 2021-05-11

## 2021-05-11 RX ORDER — LORATADINE 10 MG/1
10 CAPSULE, LIQUID FILLED ORAL DAILY
Qty: 90 EACH | Refills: 3 | Status: SHIPPED | OUTPATIENT
Start: 2021-05-11

## 2021-05-11 RX ORDER — LORAZEPAM 0.5 MG/1
0.5 TABLET ORAL 3 TIMES DAILY PRN
Qty: 90 TABLET | Refills: 2 | Status: SHIPPED | OUTPATIENT
Start: 2021-05-11 | End: 2021-11-18

## 2021-05-11 RX ORDER — IBUPROFEN 200 MG
200 TABLET ORAL EVERY 6 HOURS PRN
Qty: 90 TABLET | Refills: 3 | Status: SHIPPED | OUTPATIENT
Start: 2021-05-11 | End: 2022-04-18

## 2021-05-11 RX ORDER — THIAMINE HCL 100 MG
TABLET ORAL
Qty: 90 TABLET | Refills: 3 | Status: SHIPPED | OUTPATIENT
Start: 2021-05-11

## 2021-05-11 RX ORDER — AMLODIPINE BESYLATE 5 MG/1
5 TABLET ORAL 2 TIMES DAILY
Qty: 180 TABLET | Refills: 3 | Status: SHIPPED | OUTPATIENT
Start: 2021-05-11

## 2021-05-11 RX ORDER — OLMESARTAN MEDOXOMIL 40 MG/1
40 TABLET ORAL DAILY
Qty: 90 TABLET | Refills: 3
Start: 2021-05-11 | End: 2021-11-18

## 2021-05-11 RX ORDER — GABAPENTIN 800 MG/1
800 TABLET ORAL 3 TIMES DAILY
Qty: 90 TABLET | Refills: 2 | Status: SHIPPED | OUTPATIENT
Start: 2021-05-11 | End: 2021-06-29 | Stop reason: SDUPTHER

## 2021-05-11 RX ORDER — BUSPIRONE HYDROCHLORIDE 15 MG/1
15 TABLET ORAL 3 TIMES DAILY
Qty: 270 TABLET | Refills: 3 | Status: SHIPPED | OUTPATIENT
Start: 2021-05-11

## 2021-05-11 RX ORDER — FERROUS SULFATE 325(65) MG
325 TABLET ORAL
Qty: 270 TABLET | Refills: 3 | Status: SHIPPED | OUTPATIENT
Start: 2021-05-11

## 2021-05-11 RX ORDER — TRAMADOL HYDROCHLORIDE 50 MG/1
TABLET ORAL
Qty: 180 TABLET | Refills: 2 | Status: SHIPPED | OUTPATIENT
Start: 2021-05-11 | End: 2021-07-21 | Stop reason: SDUPTHER

## 2021-05-11 RX ORDER — ASPIRIN 81 MG/1
81 TABLET ORAL DAILY
Qty: 90 TABLET | Refills: 3 | Status: SHIPPED | OUTPATIENT
Start: 2021-05-11

## 2021-05-11 RX ORDER — METOPROLOL TARTRATE 75 MG/1
1 TABLET, FILM COATED ORAL 2 TIMES DAILY WITH MEALS
Qty: 180 TABLET | Refills: 3 | Status: SHIPPED | OUTPATIENT
Start: 2021-05-11 | End: 2022-04-18 | Stop reason: DRUGHIGH

## 2021-05-11 RX ORDER — MAGNESIUM OXIDE 400 MG/1
400 TABLET ORAL
Qty: 270 TABLET | Refills: 3 | Status: SHIPPED | OUTPATIENT
Start: 2021-05-11

## 2021-05-11 RX ORDER — POTASSIUM CHLORIDE 1.5 G/1.77G
20 POWDER, FOR SOLUTION ORAL DAILY
Qty: 90 EACH | Refills: 3 | Status: SHIPPED | OUTPATIENT
Start: 2021-05-11 | End: 2021-11-04 | Stop reason: SDUPTHER

## 2021-05-11 RX ORDER — FUROSEMIDE 40 MG/1
40 TABLET ORAL DAILY
Qty: 90 TABLET | Refills: 3 | Status: SHIPPED | OUTPATIENT
Start: 2021-05-11 | End: 2021-11-04 | Stop reason: SDUPTHER

## 2021-05-11 RX ORDER — PANTOPRAZOLE SODIUM 40 MG/1
40 TABLET, DELAYED RELEASE ORAL EVERY MORNING
Qty: 90 TABLET | Refills: 3 | Status: SHIPPED | OUTPATIENT
Start: 2021-05-11

## 2021-05-11 RX ORDER — BUMETANIDE 1 MG/1
1 TABLET ORAL 2 TIMES DAILY
Qty: 180 TABLET | Refills: 3 | Status: SHIPPED | OUTPATIENT
Start: 2021-05-11 | End: 2021-09-13

## 2021-05-11 RX ORDER — QUETIAPINE FUMARATE 50 MG/1
TABLET, FILM COATED ORAL
Qty: 360 TABLET | Refills: 3 | Status: SHIPPED | OUTPATIENT
Start: 2021-05-11 | End: 2022-04-18 | Stop reason: SDUPTHER

## 2021-05-11 RX ORDER — ONDANSETRON HYDROCHLORIDE 8 MG/1
8 TABLET, FILM COATED ORAL EVERY 8 HOURS PRN
Qty: 30 TABLET | Refills: 3 | Status: SHIPPED | OUTPATIENT
Start: 2021-05-11

## 2021-05-11 RX ORDER — SODIUM CHLORIDE 1000 MG
1 TABLET, SOLUBLE MISCELLANEOUS 3 TIMES DAILY
Qty: 270 TABLET | Refills: 3 | Status: SHIPPED | OUTPATIENT
Start: 2021-05-11

## 2021-06-29 ENCOUNTER — LAB (OUTPATIENT)
Dept: LAB | Facility: OTHER | Age: 71
End: 2021-06-29

## 2021-06-29 ENCOUNTER — OFFICE VISIT (OUTPATIENT)
Dept: FAMILY MEDICINE CLINIC | Facility: CLINIC | Age: 71
End: 2021-06-29

## 2021-06-29 DIAGNOSIS — G89.29 CHRONIC LEFT SHOULDER PAIN: Primary | ICD-10-CM

## 2021-06-29 DIAGNOSIS — E11.42 DIABETIC PERIPHERAL NEUROPATHY (HCC): ICD-10-CM

## 2021-06-29 DIAGNOSIS — M25.512 CHRONIC LEFT SHOULDER PAIN: Primary | ICD-10-CM

## 2021-06-29 DIAGNOSIS — R60.9 EDEMA, UNSPECIFIED TYPE: ICD-10-CM

## 2021-06-29 DIAGNOSIS — I50.9 EDEMA DUE TO CONGESTIVE HEART FAILURE (HCC): ICD-10-CM

## 2021-06-29 PROBLEM — N17.9 AKI (ACUTE KIDNEY INJURY) (HCC): Status: ACTIVE | Noted: 2021-05-18

## 2021-06-29 PROBLEM — D64.9 ACUTE ON CHRONIC ANEMIA: Status: ACTIVE | Noted: 2021-05-27

## 2021-06-29 LAB
ANION GAP SERPL CALCULATED.3IONS-SCNC: 7 MMOL/L (ref 5–15)
BNP SERPL-MCNC: 149 PG/ML (ref 0–100)
BUN SERPL-MCNC: 12 MG/DL (ref 7–23)
BUN/CREAT SERPL: 12.8 (ref 7–25)
CALCIUM SPEC-SCNC: 9.7 MG/DL (ref 8.4–10.2)
CHLORIDE SERPL-SCNC: 101 MMOL/L (ref 101–112)
CO2 SERPL-SCNC: 33 MMOL/L (ref 22–30)
CREAT SERPL-MCNC: 0.94 MG/DL (ref 0.52–1.04)
GFR SERPL CREATININE-BSD FRML MDRD: 59 ML/MIN/1.73 (ref 39–90)
GLUCOSE SERPL-MCNC: 95 MG/DL (ref 70–99)
POTASSIUM SERPL-SCNC: 3.9 MMOL/L (ref 3.4–5)
SODIUM SERPL-SCNC: 141 MMOL/L (ref 137–145)

## 2021-06-29 PROCEDURE — 83880 ASSAY OF NATRIURETIC PEPTIDE: CPT | Performed by: FAMILY MEDICINE

## 2021-06-29 PROCEDURE — 99214 OFFICE O/P EST MOD 30 MIN: CPT | Performed by: FAMILY MEDICINE

## 2021-06-29 PROCEDURE — 36415 COLL VENOUS BLD VENIPUNCTURE: CPT | Performed by: FAMILY MEDICINE

## 2021-06-29 PROCEDURE — 20610 DRAIN/INJ JOINT/BURSA W/O US: CPT | Performed by: FAMILY MEDICINE

## 2021-06-29 PROCEDURE — 80048 BASIC METABOLIC PNL TOTAL CA: CPT | Performed by: FAMILY MEDICINE

## 2021-06-29 RX ORDER — GABAPENTIN 800 MG/1
800 TABLET ORAL 3 TIMES DAILY
Qty: 90 TABLET | Refills: 2 | Status: SHIPPED | OUTPATIENT
Start: 2021-06-29 | End: 2021-07-21 | Stop reason: SDUPTHER

## 2021-06-29 RX ORDER — TRIAMCINOLONE ACETONIDE 40 MG/ML
80 INJECTION, SUSPENSION INTRA-ARTICULAR; INTRAMUSCULAR ONCE
Status: COMPLETED | OUTPATIENT
Start: 2021-06-29 | End: 2021-06-29

## 2021-06-29 RX ADMIN — TRIAMCINOLONE ACETONIDE 80 MG: 40 INJECTION, SUSPENSION INTRA-ARTICULAR; INTRAMUSCULAR at 18:53

## 2021-06-29 NOTE — PROGRESS NOTES
Subjective   Yung Osei is a 70 y.o. female.   Who resides at [facility] here today with concerns about left shoulder pain, left wrist pain for about the past month.  The shoulder pain is more chronic.  She cannot fully abduct or rotate the left arm at the shoulder.    It is hard for her to grasp onto things with the left hand sometimes because of the pain in the base of the thumb.  Also she has a lot of swelling in the legs and wonders if it is from her heart    She does have diabetic peripheral neuropathy and needs a refill of gabapentin.  She has only been taking it 1 time per day but we discussed increasing the dose due to her pain issues.    She continues to have a lot of swelling of both legs.  She admits that she does sit with her feet in a dependent position most of the time but says she tries to walk as often as she can.    History of Present Illness    The following portions of the patient's history were reviewed and updated as appropriate: allergies, current medications, past family history, past medical history, past social history, past surgical history and problem list.    Review of Systems   Constitutional: Negative.    HENT: Negative.    Respiratory: Negative.  Negative for shortness of breath.    Cardiovascular: Positive for leg swelling. Negative for chest pain.   Gastrointestinal: Negative.    Musculoskeletal: Positive for arthralgias, back pain and gait problem. Negative for myalgias.   Skin: Negative.    Allergic/Immunologic: Negative for immunocompromised state.   Neurological: Negative for dizziness, tremors, seizures, syncope, weakness and numbness.   Hematological: Negative.    Psychiatric/Behavioral: Negative for agitation, confusion, dysphoric mood and sleep disturbance. The patient is not nervous/anxious.    All other systems reviewed and are negative.      Objective    There is no height or weight on file to calculate BMI.  There were no vitals filed for this  visit.    Physical Exam  Vitals and nursing note reviewed.   Constitutional:       General: She is not in acute distress.     Appearance: She is not diaphoretic.   HENT:      Head: Normocephalic and atraumatic.      Nose: Nose normal.   Eyes:      General: No scleral icterus.        Right eye: No discharge.         Left eye: No discharge.      Pupils: Pupils are equal, round, and reactive to light.   Neck:      Thyroid: No thyromegaly.      Trachea: No tracheal deviation.   Cardiovascular:      Rate and Rhythm: Normal rate and regular rhythm.      Heart sounds: No murmur heard.   No friction rub.   Abdominal:      General: Bowel sounds are normal. There is no distension.      Palpations: Abdomen is soft. There is no mass.      Tenderness: There is no abdominal tenderness. There is no guarding or rebound.   Musculoskeletal:         General: Swelling present. No tenderness or deformity.      Cervical back: Normal range of motion and neck supple.      Comments: 2-3+ edema on both lower extremities with redness of the skin.    Left Shoulder is cleaned and prepped in sterile manner, anesthesias done with 1% lidocaine.  The joint is injected with 1 cc of 1% lidocaine and 2 cc of Kenalog 40 mg/ml. patient tolerated the procedure well    Finkelstein's test positive at the left wrist   Skin:     General: Skin is warm and dry.   Neurological:      Mental Status: She is alert and oriented to person, place, and time.      Cranial Nerves: No cranial nerve deficit.   Psychiatric:         Behavior: Behavior normal.       Assessment/Plan   Diagnoses and all orders for this visit:    1. Chronic left shoulder pain (Primary)  -     triamcinolone acetonide (KENALOG-40) injection 80 mg    2. Diabetic peripheral neuropathy (CMS/HCC)  -     gabapentin (Neurontin) 800 MG tablet; Take 1 tablet by mouth 3 (Three) Times a Day.  Dispense: 90 tablet; Refill: 2    3. Edema, unspecified type  -     BNP; Future  -     Basic metabolic panel;  Future    4. Edema due to congestive heart failure (CMS/[facility])  -     BNP; Future  -     Basic metabolic panel; Future    Shoulder joint injected today, as above.  If she does not get relief from this within the next or 3 days let me know we will pursue further studies    We will increase the gabapentin for the diabetic peripheral neuropathy pain in the legs and back    Recommend elevation of the feet, compression stockings, and will get a BNP to evaluate for possible CHF as a source for the swelling but I suspect it is venous stasis.    Gave a handout on exercises for de Quervain's tenosynovitis and will prescribe a thumb spica splint.          This document has been electronically signed by Senait Hussein MD on June 29, 2021 18:50 CDT

## 2021-07-13 RX ORDER — POLYETHYLENE GLYCOL 3350 17 G/17G
17 POWDER, FOR SOLUTION ORAL DAILY
COMMUNITY
End: 2021-11-18

## 2021-07-21 DIAGNOSIS — M25.552 LEFT HIP PAIN: ICD-10-CM

## 2021-07-21 DIAGNOSIS — E11.42 DIABETIC PERIPHERAL NEUROPATHY (HCC): ICD-10-CM

## 2021-07-21 RX ORDER — TRAMADOL HYDROCHLORIDE 50 MG/1
TABLET ORAL
Qty: 180 TABLET | Refills: 5 | Status: SHIPPED | OUTPATIENT
Start: 2021-07-21 | End: 2021-09-13 | Stop reason: SDUPTHER

## 2021-07-21 RX ORDER — GABAPENTIN 800 MG/1
800 TABLET ORAL 3 TIMES DAILY
Qty: 90 TABLET | Refills: 5 | Status: SHIPPED | OUTPATIENT
Start: 2021-07-21 | End: 2021-11-18 | Stop reason: SDUPTHER

## 2021-09-13 ENCOUNTER — TELEMEDICINE (OUTPATIENT)
Dept: FAMILY MEDICINE CLINIC | Facility: CLINIC | Age: 71
End: 2021-09-13

## 2021-09-13 DIAGNOSIS — E11.42 DIABETIC PERIPHERAL NEUROPATHY (HCC): ICD-10-CM

## 2021-09-13 DIAGNOSIS — M25.552 LEFT HIP PAIN: ICD-10-CM

## 2021-09-13 DIAGNOSIS — M54.42 CHRONIC MIDLINE LOW BACK PAIN WITH BILATERAL SCIATICA: ICD-10-CM

## 2021-09-13 DIAGNOSIS — M54.41 CHRONIC MIDLINE LOW BACK PAIN WITH BILATERAL SCIATICA: ICD-10-CM

## 2021-09-13 DIAGNOSIS — G89.29 CHRONIC MIDLINE LOW BACK PAIN WITH BILATERAL SCIATICA: ICD-10-CM

## 2021-09-13 DIAGNOSIS — I50.9 EDEMA DUE TO CONGESTIVE HEART FAILURE (HCC): Primary | ICD-10-CM

## 2021-09-13 PROCEDURE — 99214 OFFICE O/P EST MOD 30 MIN: CPT | Performed by: FAMILY MEDICINE

## 2021-09-13 RX ORDER — TRAMADOL HYDROCHLORIDE 50 MG/1
TABLET ORAL
Qty: 240 TABLET | Refills: 2 | Status: SHIPPED | OUTPATIENT
Start: 2021-09-13 | End: 2021-10-25 | Stop reason: SDUPTHER

## 2021-09-13 NOTE — PROGRESS NOTES
Subjective   Yung Osei is a 70 y.o. female.   Who resides at [facility].  Seen today by video visit due to the Covid-19 quarantine.     Patient with heart disease and CHF, concerned about continued swelling of the legs.  They are not as bad when she wakes up in the morning but by the time she goes to bed at night are worse.    Continues to have a lot of arthritis shoulder and leg pain and wondered about increasing the tramadol to 4 doses per day.  She also asked about gabapentin increase for neuropathy pain but she is already at 2400 mg a day    History of Present Illness    The following portions of the patient's history were reviewed and updated as appropriate: allergies, current medications, past family history, past medical history, past social history, past surgical history and problem list.    Review of Systems   Constitutional: Negative.    HENT: Negative.    Respiratory: Negative.  Negative for shortness of breath.    Cardiovascular: Positive for leg swelling. Negative for chest pain.   Gastrointestinal: Negative.    Musculoskeletal: Positive for arthralgias, back pain and gait problem. Negative for myalgias.   Skin: Negative.    Allergic/Immunologic: Negative for immunocompromised state.   Neurological: Negative for dizziness, tremors, seizures, syncope, weakness and numbness.   Hematological: Negative.    Psychiatric/Behavioral: Negative for agitation, confusion, dysphoric mood and sleep disturbance. The patient is not nervous/anxious.    All other systems reviewed and are negative.      Objective    There is no height or weight on file to calculate BMI.  There were no vitals filed for this visit.    Physical Exam  Vitals and nursing note reviewed.   Constitutional:       General: She is not in acute distress.     Appearance: She is not diaphoretic.   HENT:      Head: Normocephalic and atraumatic.      Nose: Nose normal.   Eyes:      Pupils: Pupils are equal, round, and reactive to  light.   Neck:      Thyroid: No thyromegaly.      Trachea: No tracheal deviation.   Musculoskeletal:         General: Swelling present. No deformity.      Cervical back: Normal range of motion.   Neurological:      Mental Status: She is alert and oriented to person, place, and time.      Cranial Nerves: No cranial nerve deficit.   Psychiatric:         Behavior: Behavior normal.       Assessment/Plan   Diagnoses and all orders for this visit:    1. Edema due to congestive heart failure (CMS/HCC) (Primary)    2. Left hip pain  -     traMADol (ULTRAM) 50 MG tablet; 2 tablets by mouth QID  Dispense: 240 tablet; Refill: 2    3. Diabetic peripheral neuropathy (CMS/HCC)    4. Chronic midline low back pain with bilateral sciatica    The patient has read and signed the [facility] Controlled Substance Contract.  I will continue to see patient for regular follow up appointments. Patient is well controlled on the medication.  JEANE has been reviewed by me and is updated every 3 months. The patient is aware of the potential for addiction and dependence.     We will increase tramadol to 4 doses a day and monitor renal functions and follow-up with me in a month.  Continue gabapentin for neuropathy pain at 3 a day.    Encouraged her to keep her feet elevated at all times as I suspect she does have some level of dependent edema but will check a BNP level as well.          This document has been electronically signed by Senait Hussein MD on September 13, 2021 11:38 CDT

## 2021-10-25 DIAGNOSIS — M25.552 LEFT HIP PAIN: ICD-10-CM

## 2021-10-25 RX ORDER — TRAMADOL HYDROCHLORIDE 50 MG/1
TABLET ORAL
Qty: 240 TABLET | Refills: 5 | Status: SHIPPED | OUTPATIENT
Start: 2021-10-25 | End: 2022-03-23 | Stop reason: SDUPTHER

## 2021-11-04 ENCOUNTER — TELEMEDICINE (OUTPATIENT)
Dept: FAMILY MEDICINE CLINIC | Facility: CLINIC | Age: 71
End: 2021-11-04

## 2021-11-04 DIAGNOSIS — M54.41 CHRONIC MIDLINE LOW BACK PAIN WITH BILATERAL SCIATICA: ICD-10-CM

## 2021-11-04 DIAGNOSIS — I50.9 EDEMA DUE TO CONGESTIVE HEART FAILURE (HCC): ICD-10-CM

## 2021-11-04 DIAGNOSIS — E11.42 DIABETIC PERIPHERAL NEUROPATHY (HCC): ICD-10-CM

## 2021-11-04 DIAGNOSIS — I50.22 CHRONIC SYSTOLIC HEART FAILURE (HCC): Primary | ICD-10-CM

## 2021-11-04 DIAGNOSIS — G89.29 CHRONIC MIDLINE LOW BACK PAIN WITH BILATERAL SCIATICA: ICD-10-CM

## 2021-11-04 DIAGNOSIS — M54.42 CHRONIC MIDLINE LOW BACK PAIN WITH BILATERAL SCIATICA: ICD-10-CM

## 2021-11-04 DIAGNOSIS — I87.2 VENOUS STASIS DERMATITIS OF BOTH LOWER EXTREMITIES: ICD-10-CM

## 2021-11-04 PROCEDURE — 99213 OFFICE O/P EST LOW 20 MIN: CPT | Performed by: FAMILY MEDICINE

## 2021-11-04 RX ORDER — POTASSIUM CHLORIDE 1.5 G/1.77G
20 POWDER, FOR SOLUTION ORAL 2 TIMES DAILY
Qty: 10 EACH | Refills: 0 | Status: SHIPPED | OUTPATIENT
Start: 2021-11-04 | End: 2021-11-22 | Stop reason: SDUPTHER

## 2021-11-04 RX ORDER — FUROSEMIDE 40 MG/1
40 TABLET ORAL 2 TIMES DAILY
Qty: 10 TABLET | Refills: 0 | Status: SHIPPED | OUTPATIENT
Start: 2021-11-04 | End: 2021-11-18 | Stop reason: SDUPTHER

## 2021-11-04 NOTE — PROGRESS NOTES
Subjective   Yung Osei is a 70 y.o. female.   Who resides at [facility].  Seen today by video visit due to the Covid-19 quarantine.     Patient with heart disease and CHF, complaining of increased shortness of breath and swelling in the legs.  She is on Lasix 40 mg daily.  She says even minimal exertion wears her out and causes her to be very fatigued and short of breath.    She has chronic low back pain and diabetic peripheral neuropathy, is on tramadol and gabapentin, respectively.  She feels these are both working well.    History of Present Illness    The following portions of the patient's history were reviewed and updated as appropriate: allergies, current medications, past family history, past medical history, past social history, past surgical history and problem list.    Review of Systems   Constitutional: Negative.    HENT: Negative.    Respiratory: Negative.  Negative for shortness of breath.    Cardiovascular: Positive for leg swelling. Negative for chest pain.   Gastrointestinal: Negative.    Musculoskeletal: Positive for arthralgias, back pain and gait problem. Negative for myalgias.   Skin: Negative.    Allergic/Immunologic: Negative for immunocompromised state.   Neurological: Negative for dizziness, tremors, seizures, syncope, weakness and numbness.   Hematological: Negative.    Psychiatric/Behavioral: Negative for agitation, confusion, dysphoric mood and sleep disturbance. The patient is not nervous/anxious.    All other systems reviewed and are negative.      Objective    There is no height or weight on file to calculate BMI.  There were no vitals filed for this visit.    Physical Exam  Vitals and nursing note reviewed.   Constitutional:       General: She is not in acute distress.     Appearance: She is not diaphoretic.   HENT:      Head: Normocephalic and atraumatic.      Nose: Nose normal.   Eyes:      Pupils: Pupils are equal, round, and reactive to light.   Neck:       Thyroid: No thyromegaly.      Trachea: No tracheal deviation.   Musculoskeletal:         General: Swelling present. No deformity.      Cervical back: Normal range of motion.   Neurological:      Mental Status: She is alert and oriented to person, place, and time.      Cranial Nerves: No cranial nerve deficit.   Psychiatric:         Behavior: Behavior normal.       Assessment/Plan   Diagnoses and all orders for this visit:    1. Chronic systolic heart failure (HCC) (Primary)    2. Edema due to congestive heart failure (HCC)    3. Chronic midline low back pain with bilateral sciatica    4. Diabetic peripheral neuropathy (HCC)    5. Venous stasis dermatitis of both lower extremities  -     furosemide (LASIX) 40 MG tablet; Take 1 tablet by mouth 2 (Two) Times a Day.  Dispense: 10 tablet; Refill: 0    Other orders  -     potassium chloride (KLOR-CON) 20 MEQ packet; Take 20 mEq by mouth 2 (Two) Times a Day.  Dispense: 10 each; Refill: 0    Will increase the Lasix to 40 mg, 2 tablets daily and monitor electrolytes with a BMP and BNP on next lab day.  Keep her legs elevated is much as possible.    Continue on gabapentin and tramadol for pain issues           This document has been electronically signed by Senait Hussein MD on November 4, 2021 12:26 CDT

## 2021-11-18 ENCOUNTER — OFFICE VISIT (OUTPATIENT)
Dept: FAMILY MEDICINE CLINIC | Facility: CLINIC | Age: 71
End: 2021-11-18

## 2021-11-18 DIAGNOSIS — F41.9 ANXIETY: ICD-10-CM

## 2021-11-18 DIAGNOSIS — I50.9 EDEMA DUE TO CONGESTIVE HEART FAILURE (HCC): Primary | ICD-10-CM

## 2021-11-18 DIAGNOSIS — I87.2 VENOUS STASIS DERMATITIS OF BOTH LOWER EXTREMITIES: ICD-10-CM

## 2021-11-18 DIAGNOSIS — I50.22 CHRONIC SYSTOLIC HEART FAILURE (HCC): ICD-10-CM

## 2021-11-18 DIAGNOSIS — E11.42 DIABETIC PERIPHERAL NEUROPATHY (HCC): ICD-10-CM

## 2021-11-18 PROCEDURE — 99214 OFFICE O/P EST MOD 30 MIN: CPT | Performed by: FAMILY MEDICINE

## 2021-11-18 RX ORDER — FUROSEMIDE 40 MG/1
80 TABLET ORAL DAILY
Qty: 60 TABLET | Refills: 5 | Status: SHIPPED | OUTPATIENT
Start: 2021-11-18

## 2021-11-18 RX ORDER — LORAZEPAM 0.5 MG/1
0.5 TABLET ORAL 2 TIMES DAILY
Qty: 60 TABLET | Refills: 2 | Status: SHIPPED | OUTPATIENT
Start: 2021-11-18 | End: 2022-01-06 | Stop reason: SDUPTHER

## 2021-11-18 RX ORDER — HYDROCODONE BITARTRATE AND ACETAMINOPHEN 5; 325 MG/1; MG/1
1 TABLET ORAL EVERY 6 HOURS PRN
COMMUNITY
End: 2022-04-18

## 2021-11-18 RX ORDER — CEPHALEXIN 500 MG/1
500 CAPSULE ORAL 2 TIMES DAILY
Qty: 20 CAPSULE | Refills: 0 | Status: SHIPPED | OUTPATIENT
Start: 2021-11-18 | End: 2021-12-21

## 2021-11-18 RX ORDER — GABAPENTIN 800 MG/1
800 TABLET ORAL 3 TIMES DAILY
Qty: 90 TABLET | Refills: 5 | Status: SHIPPED | OUTPATIENT
Start: 2021-11-18 | End: 2022-02-17 | Stop reason: SDUPTHER

## 2021-11-18 RX ORDER — IVERMECTIN 3 MG/1
TABLET ORAL
COMMUNITY
Start: 2021-11-08 | End: 2022-04-18

## 2021-11-18 NOTE — PROGRESS NOTES
Subjective   Yung Osei is a 70 y.o. female.   Who resides at [facility] here today here today with continued swelling in the legs and increasing shortness of breath.  She does have congestive heart failure and was concerned that it is worsening.  She felt better while she was on the higher dose of Lasix recently.  She wondered about going up on the dose again for a while.  She admits that it is difficult for her to sit still with her feet elevated and she does frequently sit with him in a dependent position.  Also the patient expresses a lot of anxiety.  She would like to have something to take when needed.  She is very despondent about her situation about being in the nursing home.  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    Leg Swelling  Associated symptoms include arthralgias. Pertinent negatives include no chest pain, myalgias, numbness or weakness.       The following portions of the patient's history were reviewed and updated as appropriate: allergies, current medications, past family history, past medical history, past social history, past surgical history and problem list.    Review of Systems   Constitutional: Negative.    HENT: Negative.    Respiratory: Negative.  Negative for shortness of breath.    Cardiovascular: Positive for leg swelling. Negative for chest pain.   Gastrointestinal: Negative.    Musculoskeletal: Positive for arthralgias, back pain and gait problem. Negative for myalgias.   Skin: Negative.    Allergic/Immunologic: Negative for immunocompromised state.   Neurological: Negative for dizziness, tremors, seizures, syncope, weakness and numbness.   Hematological: Negative.    Psychiatric/Behavioral: Negative for agitation, confusion, dysphoric mood and sleep disturbance. The patient is not  nervous/anxious.    All other systems reviewed and are negative.      Objective    There is no height or weight on file to calculate BMI.  There were no vitals filed for this visit.    Physical Exam  Vitals and nursing note reviewed.   Constitutional:       General: She is not in acute distress.     Appearance: She is not diaphoretic.   HENT:      Head: Normocephalic and atraumatic.      Nose: Nose normal.   Eyes:      General: No scleral icterus.        Right eye: No discharge.         Left eye: No discharge.      Pupils: Pupils are equal, round, and reactive to light.   Neck:      Thyroid: No thyromegaly.      Trachea: No tracheal deviation.   Cardiovascular:      Rate and Rhythm: Normal rate and regular rhythm.      Heart sounds: No murmur heard.  No friction rub.   Abdominal:      General: Bowel sounds are normal. There is no distension.      Palpations: Abdomen is soft. There is no mass.      Tenderness: There is no abdominal tenderness. There is no guarding or rebound.   Musculoskeletal:         General: Swelling present. No tenderness or deformity.      Cervical back: Normal range of motion and neck supple.      Right lower leg: Edema present.      Left lower leg: Edema present.      Comments: Both lower extremities are swollen up to the knee.  She has redness and 3+ edema in the anterior tibial areas and all around the calf.   Skin:     General: Skin is warm and dry.   Neurological:      Mental Status: She is alert and oriented to person, place, and time.      Cranial Nerves: No cranial nerve deficit.   Psychiatric:         Behavior: Behavior normal.       Assessment/Plan   Diagnoses and all orders for this visit:    1. Edema due to congestive heart failure (HCC) (Primary)    2. Chronic systolic heart failure (HCC)  -     XR Chest PA & Lateral; Future    3. Venous stasis dermatitis of both lower extremities  -     furosemide (LASIX) 40 MG tablet; Take 2 tablets by mouth Daily.  Dispense: 60 tablet; Refill:  5    4. Anxiety  -     LORazepam (Ativan) 0.5 MG tablet; Take 1 tablet by mouth 2 (Two) Times a Day.  Dispense: 60 tablet; Refill: 2    Other orders  -     cephalexin (KEFLEX) 500 MG capsule; Take 1 capsule by mouth 2 (Two) Times a Day.  Dispense: 20 capsule; Refill: 0    Suspect the swelling is multifactorial and related probably to venous stasis as well as some congestive heart failure.  We will check a BNP today and going to double the Lasix dose.  Recommend that she get some compression stockings and keep the legs elevated.  Keflex is given for cellulitis.    We will give a low-dose of Ativan to take when needed for the severe anxiety.        This document has been electronically signed by Senait Hussein MD on November 18, 2021 10:40 CST

## 2021-11-22 RX ORDER — POTASSIUM CHLORIDE 1.5 G/1.77G
20 POWDER, FOR SOLUTION ORAL 2 TIMES DAILY
Qty: 60 EACH | Refills: 0 | Status: SHIPPED | OUTPATIENT
Start: 2021-11-22

## 2021-12-01 DIAGNOSIS — I50.22 CHRONIC SYSTOLIC HEART FAILURE (HCC): Primary | ICD-10-CM

## 2021-12-21 RX ORDER — AZITHROMYCIN 250 MG/1
TABLET, FILM COATED ORAL
Qty: 6 TABLET | Refills: 0 | Status: SHIPPED | OUTPATIENT
Start: 2021-12-21 | End: 2021-12-26

## 2022-01-06 DIAGNOSIS — F41.9 ANXIETY: ICD-10-CM

## 2022-01-06 RX ORDER — LORAZEPAM 0.5 MG/1
0.5 TABLET ORAL 2 TIMES DAILY
Qty: 60 TABLET | Refills: 5 | Status: SHIPPED | OUTPATIENT
Start: 2022-01-06 | End: 2022-02-17 | Stop reason: SDUPTHER

## 2022-02-17 DIAGNOSIS — E11.42 DIABETIC PERIPHERAL NEUROPATHY: ICD-10-CM

## 2022-02-17 DIAGNOSIS — F41.9 ANXIETY: ICD-10-CM

## 2022-02-17 RX ORDER — LORAZEPAM 0.5 MG/1
0.5 TABLET ORAL 2 TIMES DAILY
Qty: 60 TABLET | Refills: 5 | Status: SHIPPED | OUTPATIENT
Start: 2022-02-17 | End: 2022-03-23 | Stop reason: SDUPTHER

## 2022-02-17 RX ORDER — GABAPENTIN 800 MG/1
800 TABLET ORAL 3 TIMES DAILY
Qty: 90 TABLET | Refills: 5 | Status: SHIPPED | OUTPATIENT
Start: 2022-02-17 | End: 2022-03-23 | Stop reason: SDUPTHER

## 2022-03-23 DIAGNOSIS — M25.552 LEFT HIP PAIN: ICD-10-CM

## 2022-03-23 DIAGNOSIS — E11.42 DIABETIC PERIPHERAL NEUROPATHY: ICD-10-CM

## 2022-03-23 DIAGNOSIS — F41.9 ANXIETY: ICD-10-CM

## 2022-03-23 RX ORDER — LORAZEPAM 0.5 MG/1
0.5 TABLET ORAL 2 TIMES DAILY
Qty: 60 TABLET | Refills: 5 | Status: SHIPPED | OUTPATIENT
Start: 2022-03-23

## 2022-03-23 RX ORDER — TRAMADOL HYDROCHLORIDE 50 MG/1
TABLET ORAL
Qty: 240 TABLET | Refills: 5 | Status: SHIPPED | OUTPATIENT
Start: 2022-03-23 | End: 2022-03-24 | Stop reason: SDUPTHER

## 2022-03-23 RX ORDER — GABAPENTIN 800 MG/1
800 TABLET ORAL 3 TIMES DAILY
Qty: 90 TABLET | Refills: 5 | Status: SHIPPED | OUTPATIENT
Start: 2022-03-23 | End: 2022-03-24 | Stop reason: DRUGHIGH

## 2022-03-24 DIAGNOSIS — M25.552 LEFT HIP PAIN: ICD-10-CM

## 2022-03-24 DIAGNOSIS — F41.9 ANXIETY: ICD-10-CM

## 2022-03-24 RX ORDER — GABAPENTIN 400 MG/1
400 CAPSULE ORAL 3 TIMES DAILY
Qty: 90 CAPSULE | Refills: 5 | Status: SHIPPED | OUTPATIENT
Start: 2022-03-24

## 2022-03-24 RX ORDER — TRAMADOL HYDROCHLORIDE 50 MG/1
50 TABLET ORAL 3 TIMES DAILY
Qty: 240 TABLET | Refills: 5 | Status: SHIPPED | OUTPATIENT
Start: 2022-03-24 | End: 2022-04-18 | Stop reason: SDUPTHER

## 2022-04-18 ENCOUNTER — OFFICE VISIT (OUTPATIENT)
Dept: FAMILY MEDICINE CLINIC | Facility: CLINIC | Age: 72
End: 2022-04-18

## 2022-04-18 VITALS
DIASTOLIC BLOOD PRESSURE: 72 MMHG | HEIGHT: 62 IN | OXYGEN SATURATION: 92 % | HEART RATE: 86 BPM | TEMPERATURE: 97.3 F | WEIGHT: 177 LBS | BODY MASS INDEX: 32.57 KG/M2 | SYSTOLIC BLOOD PRESSURE: 130 MMHG

## 2022-04-18 DIAGNOSIS — Z78.0 POSTMENOPAUSAL: ICD-10-CM

## 2022-04-18 DIAGNOSIS — F31.9 BIPOLAR 1 DISORDER: Primary | ICD-10-CM

## 2022-04-18 DIAGNOSIS — F41.9 ANXIETY: ICD-10-CM

## 2022-04-18 DIAGNOSIS — M25.552 LEFT HIP PAIN: ICD-10-CM

## 2022-04-18 DIAGNOSIS — I50.22 CHRONIC SYSTOLIC HEART FAILURE: ICD-10-CM

## 2022-04-18 DIAGNOSIS — I87.8 VENOUS STASIS OF BOTH LOWER EXTREMITIES: ICD-10-CM

## 2022-04-18 DIAGNOSIS — Z12.31 ENCOUNTER FOR SCREENING MAMMOGRAM FOR MALIGNANT NEOPLASM OF BREAST: ICD-10-CM

## 2022-04-18 PROCEDURE — 99214 OFFICE O/P EST MOD 30 MIN: CPT | Performed by: FAMILY MEDICINE

## 2022-04-18 RX ORDER — QUETIAPINE FUMARATE 100 MG/1
TABLET, FILM COATED ORAL
COMMUNITY
Start: 2022-03-01

## 2022-04-18 RX ORDER — BENZONATATE 100 MG/1
100 CAPSULE ORAL 3 TIMES DAILY PRN
COMMUNITY

## 2022-04-18 RX ORDER — PROMETHAZINE HYDROCHLORIDE 25 MG/1
SUPPOSITORY RECTAL
COMMUNITY
Start: 2022-03-24

## 2022-04-18 RX ORDER — TRAMADOL HYDROCHLORIDE 50 MG/1
100 TABLET ORAL 4 TIMES DAILY
Qty: 240 TABLET | Refills: 5 | Status: SHIPPED | OUTPATIENT
Start: 2022-04-18

## 2022-04-18 RX ORDER — TOPIRAMATE 25 MG/1
25 TABLET ORAL 2 TIMES DAILY
Qty: 60 TABLET | Refills: 5 | Status: SHIPPED | OUTPATIENT
Start: 2022-04-18

## 2022-04-18 RX ORDER — QUETIAPINE FUMARATE 50 MG/1
TABLET, FILM COATED ORAL
Qty: 180 TABLET | Refills: 3 | Status: SHIPPED | OUTPATIENT
Start: 2022-04-18

## 2022-04-18 RX ORDER — LURASIDONE HYDROCHLORIDE 40 MG/1
40 TABLET, FILM COATED ORAL DAILY
Qty: 30 TABLET | Refills: 5 | Status: SHIPPED | OUTPATIENT
Start: 2022-04-18

## 2022-04-18 NOTE — PROGRESS NOTES
Arleen Osei is a 71 y.o. female.   Who resides at [facility] who left briefly and is back in here today to reestablish care.  She has bipolar disorder and is on several medications.  She is having a lot of anxiety and continues to complain of a lot of pain in the left hip.  Her current dose of tramadol is 3 times a day.  When she was in the facility before she took it 4 times a day and a higher dose.  She is not a good candidate for NSAIDs.  She is also on gabapentin.    She takes lorazepam up to twice a day for anxiety.  She has been on a number of medications for depression and anxiety in the past as well as bipolar disorder including multiple SSRIs Wellbutrin and buspirone.  She felt none of these effective.    Continues to have a lot of pain in the left shoulder that goes up toward the neck    She has a lot of swelling in both legs it bothers her.  She does have a history of CHF and is on Norvasc but is also on Lasix.    She is concerned about continuing to gain weight and we discussed perhaps tapering her down on the Seroquel a bit as I am sure this is not helping.  She is on this as a mood stabilizer.    Leg Swelling  Associated symptoms include arthralgias. Pertinent negatives include no chest pain, myalgias, numbness or weakness.       The following portions of the patient's history were reviewed and updated as appropriate: allergies, current medications, past family history, past medical history, past social history, past surgical history and problem list.    Review of Systems   Constitutional: Negative.    HENT: Negative.    Respiratory: Negative.  Negative for shortness of breath.    Cardiovascular: Positive for leg swelling. Negative for chest pain.   Gastrointestinal: Negative.    Musculoskeletal: Positive for arthralgias, back pain and gait problem. Negative for myalgias.   Skin: Negative.    Allergic/Immunologic: Negative for immunocompromised state.   Neurological:  "Negative for dizziness, tremors, seizures, syncope, weakness and numbness.   Hematological: Negative.    Psychiatric/Behavioral: Negative for agitation, confusion, dysphoric mood and sleep disturbance. The patient is not nervous/anxious.    All other systems reviewed and are negative.      Objective    Body mass index is 32.37 kg/m².  Vitals:    04/18/22 1015   BP: 130/72   Pulse: 86   Temp: 97.3 °F (36.3 °C)   SpO2: 92%   Weight: 80.3 kg (177 lb)   Height: 157.5 cm (62\")       Physical Exam  Vitals and nursing note reviewed.   Constitutional:       General: She is not in acute distress.     Appearance: She is not diaphoretic.   HENT:      Head: Normocephalic and atraumatic.      Nose: Nose normal.   Eyes:      General: No scleral icterus.        Right eye: No discharge.         Left eye: No discharge.      Pupils: Pupils are equal, round, and reactive to light.   Neck:      Thyroid: No thyromegaly.      Trachea: No tracheal deviation.   Cardiovascular:      Rate and Rhythm: Normal rate and regular rhythm.      Heart sounds: No murmur heard.    No friction rub.   Abdominal:      General: Bowel sounds are normal. There is no distension.      Palpations: Abdomen is soft. There is no mass.      Tenderness: There is no abdominal tenderness. There is no guarding or rebound.   Musculoskeletal:         General: Swelling present. No tenderness or deformity.      Cervical back: Normal range of motion and neck supple.      Right lower leg: Edema present.      Left lower leg: Edema present.      Comments: 2+ pitting edema from the ankle to the knee on both lower extremities, no significant redness, no drainage   Skin:     General: Skin is warm and dry.   Neurological:      Mental Status: She is alert and oriented to person, place, and time.      Cranial Nerves: No cranial nerve deficit.   Psychiatric:         Behavior: Behavior normal.       Assessment/Plan   Diagnoses and all orders for this visit:    1. Bipolar 1 disorder " (HCC) (Primary)  -     lurasidone (Latuda) 40 MG tablet tablet; Take 1 tablet by mouth Daily.  Dispense: 30 tablet; Refill: 5  -     topiramate (TOPAMAX) 25 MG tablet; Take 1 tablet by mouth 2 (Two) Times a Day.  Dispense: 60 tablet; Refill: 5    2. Encounter for screening mammogram for malignant neoplasm of breast  -     Mammo Screening Digital Tomosynthesis Bilateral With CAD; Future    3. Postmenopausal  -     DEXA Bone Density Axial    4. Anxiety  -     QUEtiapine (SEROquel) 50 MG tablet; 2 tablets at bedtime  Dispense: 180 tablet; Refill: 3    5. Left hip pain  -     traMADol (ULTRAM) 50 MG tablet; Take 2 tablets by mouth 4 (Four) Times a Day. 2 tablets by mouth QID  Dispense: 240 tablet; Refill: 5    6. Venous stasis of both lower extremities    7. Chronic systolic heart failure (HCC)    Reminded patient to wear compression stockings, keep the legs elevated as much as possible.  She is on Lasix.  The amlodipine may be contributing somewhat to the swelling but is effective for her hypertension.  No sign of a flareup of acute CHF at this time    Continue to keep the Ativan at lower doses I would rather not increase it due to fall risk but will add Latuda for bipolar depression.  We will add Topamax and start to taper down on the Seroquel as a mood stabilizer in hopes that this will not contribute to weight gain as much.    We will go up on the dose of the tramadol for the pain issues, as above and continue to monitor.  We will have her back in a month.    Her diet choices are limited being a patient at the facility but we discussed some better food choices and exercise and she is going to try to walk around the facility to be a bit more active.  Discussed the patient's BMI with her. BMI is above normal parameters. Follow-up plan includes:  educational material and nutrition counseling.    Continue gabapentin for the left hip pain.  The facility will contact me when it is due.    She does reside in a personal  care facility which keeps all of her medications in a locked container and dispenses them as prescribed.    Mammogram is due as is bone density.  Both will be scheduled.              This document has been electronically signed by Senait Hussein MD on April 18, 2022 11:08 CDT

## 2023-08-24 ENCOUNTER — EXTERNAL PBMM DATA (OUTPATIENT)
Dept: PHARMACY | Facility: OTHER | Age: 73
End: 2023-08-24
Payer: MEDICARE